# Patient Record
Sex: MALE | Race: OTHER | HISPANIC OR LATINO
[De-identification: names, ages, dates, MRNs, and addresses within clinical notes are randomized per-mention and may not be internally consistent; named-entity substitution may affect disease eponyms.]

---
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
## 2018-09-20 ENCOUNTER — TRANSCRIPTION ENCOUNTER (OUTPATIENT)
Age: 18
End: 2018-09-20

## 2018-09-20 ENCOUNTER — EMERGENCY (EMERGENCY)
Facility: HOSPITAL | Age: 18
LOS: 0 days | Discharge: HOME | End: 2018-09-20
Attending: EMERGENCY MEDICINE | Admitting: EMERGENCY MEDICINE

## 2018-09-20 VITALS
DIASTOLIC BLOOD PRESSURE: 67 MMHG | HEART RATE: 88 BPM | SYSTOLIC BLOOD PRESSURE: 130 MMHG | RESPIRATION RATE: 17 BRPM | OXYGEN SATURATION: 97 % | TEMPERATURE: 98 F

## 2018-09-20 DIAGNOSIS — J02.9 ACUTE PHARYNGITIS, UNSPECIFIED: ICD-10-CM

## 2018-09-20 DIAGNOSIS — J36 PERITONSILLAR ABSCESS: ICD-10-CM

## 2018-09-20 RX ORDER — KETOROLAC TROMETHAMINE 30 MG/ML
30 SYRINGE (ML) INJECTION ONCE
Qty: 0 | Refills: 0 | Status: DISCONTINUED | OUTPATIENT
Start: 2018-09-20 | End: 2018-09-20

## 2018-09-20 RX ORDER — TETRACAINE/BENZOCAINE/BUTAMBEN 2%-14%-2%
1 OINTMENT (GRAM) TOPICAL ONCE
Qty: 0 | Refills: 0 | Status: COMPLETED | OUTPATIENT
Start: 2018-09-20 | End: 2018-09-20

## 2018-09-20 RX ORDER — IBUPROFEN 200 MG
400 TABLET ORAL ONCE
Qty: 0 | Refills: 0 | Status: DISCONTINUED | OUTPATIENT
Start: 2018-09-20 | End: 2018-09-20

## 2018-09-20 RX ADMIN — Medication 30 MILLIGRAM(S): at 14:53

## 2018-09-20 RX ADMIN — Medication 100 MILLIGRAM(S): at 14:53

## 2018-09-20 NOTE — ED PROVIDER NOTE - NS ED ROS FT
GEN:  + fever, no chills  NEURO:  no headache, no weakness, no dizziness  EYES: no eye pain, no eye discharge  ENT:  no ear pain, + sore throat, no runny nose, + difficulty swallowing  CV:  no chest pain, no SOB  RESP:  no dyspnea, no cough  GI:  no nausea, no vomiting, no abdominal pain  :  no dysuria, no hematuria  MSK:  no myalgia, no joint pain, no back pain  SKIN:  no rash

## 2018-09-20 NOTE — ED PROVIDER NOTE - OBJECTIVE STATEMENT
17 yo M with no PMHx who presents with worsening L sided throat swelling and pain x 3 days. Pain is worse with swallowing. Associated fever Tmax 101, increased salivation, change in voice. No drooling, stridor, SOB, nausea, vomiting, abd pain. Went to Urgent Care today and was diagnosed with peritonsillar abscess. He was given decadron 10 mg and motrin 200 mg and sent to the ED for drainage. Reports swelling and pain mildly improved now.

## 2018-09-20 NOTE — ED ADULT TRIAGE NOTE - CHIEF COMPLAINT QUOTE
Pt BIBA for evaluation of peritonsillar abscess. Pt having difficulty swallowing. No difficulty breathing

## 2018-09-20 NOTE — ED PROVIDER NOTE - PROGRESS NOTE DETAILS
ATTENDING NOTE: 17 y/o male with no significant PMH c/o sore throat and muffled voice x 3 days with subjective fever since last night. No H/A, no neck pain. No cough, no SOB. No ABD pain. No N/V. Went to an urgent care center today and received decadron. O/E: Non-toxic in appearance. No respiratory distress. No pallor, no jaundice. TM's clear b/l. Throat with erythema, injection, bulging of left tonsil with mild uvular deviation to right. Airway patent. Neck supple, no meningeal signs. No tenderness over external jugular vein. Lungs CTA b/l. S1 S2 regular, no murmur. ABD soft, no tenderness. No pedal edema, no calf tenderness. No skin rash. No neurologic deficits.   Imp: Pharyngitis. Likely small peritonsillar abscess. No sign of Lemierre's.  A/P: I&D, abx. Already received decadron. Will refer to ENT for f/u. Performed PTA drainage, no discharge aspirated. Pt given clindamycin 900 mg IV here in ED. Sent rx for clindamycin 450 mg Q6H x 14 d. Instructed to return to ED in 2 days for recheck. Given ENT follow up. Motrin prn for pain. Performed PTA drainage, no discharge aspirated. Pt given clindamycin 900 mg IV here in ED. Sent rx for clindamycin 300 mg Q6H x 14 d. Instructed to return to ED in 2 days for recheck. Given ENT follow up. Motrin prn for pain.

## 2018-09-20 NOTE — ED PROVIDER NOTE - PHYSICAL EXAMINATION
CONSTITUTIONAL: nontoxic appearing, in no acute distress  HEAD:  normocephalic, atraumatic  EYES:  no conjunctival injection, no eye discharge, tracking well  ENT:  tympanic membranes intact bilaterally, moist mucous membranes, L sided soft palate and tonsillar swelling/erythema, uvula deviated to L  NECK:  supple, no masses, no midline tenderness  L anterior cervical lymphadenopathy  CV:  regular rate and rhythm, no murmurs, no rubs, cap refill < 2 seconds  RESP:  normal respiratory effort, lungs clear to auscultation bilaterally, no wheezes, no crackles, no retractions, no stridor  ABD:  soft, nontender, nondistended, no masses, no organomegaly, normoactive bowel sounds  LYMPH:  L anterior cervical lymphadenopathy  MSK/NEURO:  normal movement, normal tone  SKIN:  warm, dry, no rash

## 2018-09-20 NOTE — ED PEDIATRIC NURSE NOTE - OBJECTIVE STATEMENT
Sent in by Urgent care for L side peritonsillar abscess x 3 days with difficulty speaking and swallowing. No difficulty breathing or sob

## 2018-09-20 NOTE — ED PEDIATRIC NURSE NOTE - NSIMPLEMENTINTERV_GEN_ALL_ED
Implemented All Universal Safety Interventions:  [place] to call system. Call bell, personal items and telephone within reach. Instruct patient to call for assistance. Room bathroom lighting operational. Non-slip footwear when patient is off stretcher. Physically safe environment: no spills, clutter or unnecessary equipment. Stretcher in lowest position, wheels locked, appropriate side rails in place.

## 2018-09-20 NOTE — ED PROVIDER NOTE - MEDICAL DECISION MAKING DETAILS
I&D performed but no pus obtained. Treated with abx. Referred to ENT for f/u. Will return to ED in 2 days for recheck.

## 2022-06-30 ENCOUNTER — APPOINTMENT (OUTPATIENT)
Dept: NEUROLOGY | Facility: CLINIC | Age: 22
End: 2022-06-30

## 2022-06-30 VITALS
HEART RATE: 68 BPM | HEIGHT: 70 IN | WEIGHT: 180 LBS | BODY MASS INDEX: 25.77 KG/M2 | SYSTOLIC BLOOD PRESSURE: 136 MMHG | DIASTOLIC BLOOD PRESSURE: 85 MMHG

## 2022-07-08 ENCOUNTER — APPOINTMENT (OUTPATIENT)
Dept: PAIN MANAGEMENT | Facility: CLINIC | Age: 22
End: 2022-07-08

## 2022-07-08 VITALS
DIASTOLIC BLOOD PRESSURE: 82 MMHG | HEART RATE: 67 BPM | BODY MASS INDEX: 25.77 KG/M2 | WEIGHT: 180 LBS | HEIGHT: 70 IN | SYSTOLIC BLOOD PRESSURE: 127 MMHG

## 2022-07-08 DIAGNOSIS — M62.830 MUSCLE SPASM OF BACK: ICD-10-CM

## 2022-07-08 DIAGNOSIS — M54.16 RADICULOPATHY, LUMBAR REGION: ICD-10-CM

## 2022-07-08 DIAGNOSIS — M54.50 LOW BACK PAIN, UNSPECIFIED: ICD-10-CM

## 2022-07-08 PROCEDURE — 99072 ADDL SUPL MATRL&STAF TM PHE: CPT

## 2022-07-08 PROCEDURE — 99213 OFFICE O/P EST LOW 20 MIN: CPT | Mod: PA

## 2022-07-08 NOTE — HISTORY OF PRESENT ILLNESS
[FreeTextEntry1] : HISTORY OF PRESENT ILLNESS: Mr. Bustos is a 22-year-old man presenting today to establish care for his lower back pain and neck pain. He states he had slipped on detergent on the floor, landing on his back. In regard to his lower back pain, pain is constant, severe and rated a 10/10 on the pain scale. Pain is present and radiates into his right leg.\par \par In regard to his neck, pain is severe and radiates into his trapezius.\par \par The pain started after an injury at work. The patient has had this pain for about 1 month. Patient describes pain as severe. During the last month the pain has been constant with symptoms worsening in no typical pattern. Pain described as sharp, pressure-like, cramping, dull, throbbing, shooting. Pain is increased with standing, exercise. Pain is decreased with relaxation. Pain is not changed with lying down, sitting, walking. Bowel or bladder habits have not changed.\par \par ACTIVITIES: Patient could walk 0 blocks before the pain starts. Patient can sit with no pain. Patient can stand 15 minutes before pain starts. The patient constantly lays down because of pain. Patient uses no assisted walking device at this time. Patient has difficulty going to work, doing yardwork or shopping, socializing with friends, participating in recreational activities or exercise at this time.\par \par Prior Pain Medications: Naproxen, Flexeril\par \par TODAY: He presents for a revisit encounter. His lower back pain has improved. He completed PT. He continues with neck and the thoracic pain, which are not part of his WC case. Patient states he wishes to go back to work with restrictions.

## 2022-07-08 NOTE — DISCUSSION/SUMMARY
[de-identified] : 22-year-old male with lower back pain likely musculoskeletal in nature. He completed PT. I have discussed the importance of weight loss and a low-impact exercise regimen. He will follow up in 6-8 weeks for reevaluation. He will return back to work with restrictions.\par \par María Hernandez PA-C\par Lars Gayle MD\par

## 2022-08-11 ENCOUNTER — NON-APPOINTMENT (OUTPATIENT)
Age: 22
End: 2022-08-11

## 2022-09-09 ENCOUNTER — APPOINTMENT (OUTPATIENT)
Dept: PAIN MANAGEMENT | Facility: CLINIC | Age: 22
End: 2022-09-09

## 2022-11-16 ENCOUNTER — NON-APPOINTMENT (OUTPATIENT)
Age: 22
End: 2022-11-16

## 2022-12-16 ENCOUNTER — EMERGENCY (EMERGENCY)
Facility: HOSPITAL | Age: 22
LOS: 0 days | Discharge: HOME | End: 2022-12-16
Attending: EMERGENCY MEDICINE | Admitting: EMERGENCY MEDICINE

## 2022-12-16 VITALS
SYSTOLIC BLOOD PRESSURE: 143 MMHG | HEART RATE: 98 BPM | TEMPERATURE: 98 F | RESPIRATION RATE: 18 BRPM | DIASTOLIC BLOOD PRESSURE: 87 MMHG | OXYGEN SATURATION: 100 %

## 2022-12-16 DIAGNOSIS — K02.9 DENTAL CARIES, UNSPECIFIED: ICD-10-CM

## 2022-12-16 DIAGNOSIS — K08.89 OTHER SPECIFIED DISORDERS OF TEETH AND SUPPORTING STRUCTURES: ICD-10-CM

## 2022-12-16 DIAGNOSIS — J45.909 UNSPECIFIED ASTHMA, UNCOMPLICATED: ICD-10-CM

## 2022-12-16 PROCEDURE — 99284 EMERGENCY DEPT VISIT MOD MDM: CPT

## 2022-12-16 RX ORDER — ALBUTEROL 90 UG/1
2 AEROSOL, METERED ORAL
Qty: 1 | Refills: 0
Start: 2022-12-16 | End: 2023-01-14

## 2022-12-16 RX ORDER — IBUPROFEN 200 MG
800 TABLET ORAL ONCE
Refills: 0 | Status: COMPLETED | OUTPATIENT
Start: 2022-12-16 | End: 2022-12-16

## 2022-12-16 RX ADMIN — Medication 800 MILLIGRAM(S): at 09:08

## 2022-12-16 NOTE — ED ADULT NURSE NOTE - OBJECTIVE STATEMENT
pt c/o right dental pain, right cheek is swollen, painful tooth 3rd from the back lower right side is intact, airway clear, pain started yesterday, pt woke up today with right cheek swelling

## 2022-12-16 NOTE — ED PROVIDER NOTE - OBJECTIVE STATEMENT
22-year-old male history of asthma otherwise healthy presenting for evaluation of right mandibular dentalgia.  No fevers chills drainage.  Noted swelling to the face this morning.  No difficulty breathing/swallowing.  Symptoms gradual onset, moderate.  Took Advil last night with mild improvement.

## 2022-12-16 NOTE — ED ADULT NURSE NOTE - NS ED NOTE ABUSE SUSPICION NEGLECT YN
Patient called back and stated that she is wanting to get an MRI before she see's Dr. Higuera. I explained to her that I would have to send a message to the nurse and she would have to get the okay from Dr. Higuera as well as an order. Pt also stated he would like her order sent to another facility to be done. Information was sent to nurse to call pt.   No

## 2022-12-16 NOTE — ED PROVIDER NOTE - PHYSICAL EXAMINATION
Well appearing, NAD, non toxic. NCAT PERRLA EOMI neck supple non tender normal wob  Right facial swelling, airway intact, no drooling, no stridor, no pooling of secretions, no posterior oropharyngeal erythema/edema/lesions. Speaking in full sentences. +tolerating secretions, tolerating PO WWPx4 neuro non focal

## 2022-12-16 NOTE — CONSULT NOTE ADULT - SUBJECTIVE AND OBJECTIVE BOX
Patient is a 22y old  Male who presents with a chief complaint of pain in the lower right side.     HPI: Patient developed a swelling in the lower right side last night.       PAST MEDICAL & SURGICAL HISTORY:  No pertinent past medical history        (   ) heart valve replacement  (   ) joint replacement  (   ) pregnancy    MEDICATIONS  (STANDING):    MEDICATIONS  (PRN):      Allergies    No Known Allergies    Intolerances        FAMILY HISTORY:      *SOCIAL HISTORY: (   ) Tobacco; (   ) ETOH    *Last Dental Visit:    Vital Signs Last 24 Hrs  T(C): 36.8 (16 Dec 2022 08:48), Max: 36.8 (16 Dec 2022 08:48)  T(F): 98.2 (16 Dec 2022 08:48), Max: 98.2 (16 Dec 2022 08:48)  HR: 98 (16 Dec 2022 08:48) (98 - 98)  BP: 143/87 (16 Dec 2022 08:48) (143/87 - 143/87)  BP(mean): --  RR: 18 (16 Dec 2022 08:48) (18 - 18)  SpO2: 100% (16 Dec 2022 08:48) (100% - 100%)    Parameters below as of 16 Dec 2022 08:48  Patient On (Oxygen Delivery Method): room air        LABS:                  EOE:  TMJ ( -  ) clicks                     (  - ) pops                     (  - ) crepitus             Mandible <<FROM>>             Facial bones and MOM <<grossly intact>>             (  - ) trismus             ( -  ) lymphadenopathy             ( +  ) swelling             ( +  ) asymmetry             ( +  ) palpation             ( -  ) dyspnea             ( -  ) dysphagia             (  - ) loss of consciousness    IOE:  <<permanent/primary/mixed>> dentition: <<grossly intact>> OR <<multiple carious teeth>> OR <<multiple missing teeth>>           hard/soft palate:  (   ) palatal torus, <<No pathology noted>>           tongue/FOM <<No pathology noted>>           labial/buccal mucosa <<No pathology noted>>           (+  ) percussion           (  + ) palpation           ( +  ) swelling            (  - ) abscess           (  - ) sinus tract    Dentition present: <<y   >>  Mobility: <<y  >>  Caries: << y  >>         *DENTAL RADIOGRAPHS: periapical x-ray tooth #30     RADIOLOGY & ADDITIONAL STUDIES:    *ASSESSMENT: Clinically observed vestibular swelling in the lower right side. Patient reports pain upon palpation and percussion of tooth #30.   Radiographically observed tooth #30 gross caries and periapical pathology, non-restorable.       *PLAN: Extraction tooth #30    PROCEDURE:    Consents and site obtained. Risks and benefits discussed with patient as per OS sheet dated 07/13/00. Albuterol inhaler present on tray. Administered 3 carpules of 2% lidocaine with 1:100,000 epinephrine via right inferior alveolar block and buccal infiltration. Throat screen placed. Elevated and extracted #30. Irrigated with saline solution. Hemostasis achieved. Post-op periapical c x-ray taken. Post- op instructions given to the patient.   RX: Amoxicillin 500 mg 1 tablet every 8 hours for 7 days.     RECOMMENDATIONS:  1) <<   >>  2) Dental F/U with outpatient dentist for comprehensive dental care.   3) If any difficulty swallowing/breathing, fever occur, return to ER.     Resident Name, pager #

## 2022-12-16 NOTE — ED PROVIDER NOTE - CLINICAL SUMMARY MEDICAL DECISION MAKING FREE TEXT BOX
22-year-old male history of asthma otherwise healthy presenting for evaluation of right mandibular dentalgia.  No fevers chills drainage.  Noted swelling to the face this morning.  No difficulty breathing/swallowing.  Symptoms gradual onset, moderate.  Took Advil last night with mild improvement. aou dental

## 2023-01-09 NOTE — ED PEDIATRIC NURSE NOTE - PRIMARY CARE PROVIDER
PMD Otezla Counseling: The side effects of Otezla were discussed with the patient, including but not limited to worsening or new depression, weight loss, diarrhea, nausea, upper respiratory tract infection, and headache. Patient instructed to call the office should any adverse effect occur.  The patient verbalized understanding of the proper use and possible adverse effects of Otezla.  All the patient's questions and concerns were addressed.

## 2025-06-01 ENCOUNTER — EMERGENCY (EMERGENCY)
Facility: HOSPITAL | Age: 25
LOS: 1 days | End: 2025-06-01
Attending: STUDENT IN AN ORGANIZED HEALTH CARE EDUCATION/TRAINING PROGRAM | Admitting: STUDENT IN AN ORGANIZED HEALTH CARE EDUCATION/TRAINING PROGRAM
Payer: MEDICAID

## 2025-06-01 VITALS
WEIGHT: 179.9 LBS | HEART RATE: 88 BPM | TEMPERATURE: 98 F | DIASTOLIC BLOOD PRESSURE: 56 MMHG | RESPIRATION RATE: 19 BRPM | OXYGEN SATURATION: 99 % | SYSTOLIC BLOOD PRESSURE: 119 MMHG

## 2025-06-01 LAB
ALBUMIN SERPL ELPH-MCNC: 3.8 G/DL — SIGNIFICANT CHANGE UP (ref 3.3–5)
ALP SERPL-CCNC: 67 U/L — SIGNIFICANT CHANGE UP (ref 40–120)
ALT FLD-CCNC: 15 U/L — SIGNIFICANT CHANGE UP (ref 4–41)
ANION GAP SERPL CALC-SCNC: 11 MMOL/L — SIGNIFICANT CHANGE UP (ref 7–14)
APPEARANCE UR: CLEAR — SIGNIFICANT CHANGE UP
AST SERPL-CCNC: 12 U/L — SIGNIFICANT CHANGE UP (ref 4–40)
BASOPHILS # BLD AUTO: 0.04 K/UL — SIGNIFICANT CHANGE UP (ref 0–0.2)
BASOPHILS NFR BLD AUTO: 0.3 % — SIGNIFICANT CHANGE UP (ref 0–2)
BILIRUB SERPL-MCNC: 0.6 MG/DL — SIGNIFICANT CHANGE UP (ref 0.2–1.2)
BILIRUB UR-MCNC: NEGATIVE — SIGNIFICANT CHANGE UP
BUN SERPL-MCNC: 13 MG/DL — SIGNIFICANT CHANGE UP (ref 7–23)
CALCIUM SERPL-MCNC: 8.6 MG/DL — SIGNIFICANT CHANGE UP (ref 8.4–10.5)
CHLORIDE SERPL-SCNC: 104 MMOL/L — SIGNIFICANT CHANGE UP (ref 98–107)
CO2 SERPL-SCNC: 19 MMOL/L — LOW (ref 22–31)
COLOR SPEC: YELLOW — SIGNIFICANT CHANGE UP
CREAT SERPL-MCNC: 0.75 MG/DL — SIGNIFICANT CHANGE UP (ref 0.5–1.3)
DIFF PNL FLD: NEGATIVE — SIGNIFICANT CHANGE UP
EGFR: 127 ML/MIN/1.73M2 — SIGNIFICANT CHANGE UP
EGFR: 127 ML/MIN/1.73M2 — SIGNIFICANT CHANGE UP
EOSINOPHIL # BLD AUTO: 0.13 K/UL — SIGNIFICANT CHANGE UP (ref 0–0.5)
EOSINOPHIL NFR BLD AUTO: 1.1 % — SIGNIFICANT CHANGE UP (ref 0–6)
GLUCOSE SERPL-MCNC: 153 MG/DL — HIGH (ref 70–99)
GLUCOSE UR QL: NEGATIVE MG/DL — SIGNIFICANT CHANGE UP
HCT VFR BLD CALC: 42.7 % — SIGNIFICANT CHANGE UP (ref 39–50)
HGB BLD-MCNC: 15 G/DL — SIGNIFICANT CHANGE UP (ref 13–17)
IANC: 9.41 K/UL — HIGH (ref 1.8–7.4)
IMM GRANULOCYTES NFR BLD AUTO: 0.3 % — SIGNIFICANT CHANGE UP (ref 0–0.9)
KETONES UR QL: NEGATIVE MG/DL — SIGNIFICANT CHANGE UP
LEUKOCYTE ESTERASE UR-ACNC: NEGATIVE — SIGNIFICANT CHANGE UP
LYMPHOCYTES # BLD AUTO: 1.32 K/UL — SIGNIFICANT CHANGE UP (ref 1–3.3)
LYMPHOCYTES # BLD AUTO: 11.2 % — LOW (ref 13–44)
MCHC RBC-ENTMCNC: 30.4 PG — SIGNIFICANT CHANGE UP (ref 27–34)
MCHC RBC-ENTMCNC: 35.1 G/DL — SIGNIFICANT CHANGE UP (ref 32–36)
MCV RBC AUTO: 86.4 FL — SIGNIFICANT CHANGE UP (ref 80–100)
MONOCYTES # BLD AUTO: 0.8 K/UL — SIGNIFICANT CHANGE UP (ref 0–0.9)
MONOCYTES NFR BLD AUTO: 6.8 % — SIGNIFICANT CHANGE UP (ref 2–14)
NEUTROPHILS # BLD AUTO: 9.41 K/UL — HIGH (ref 1.8–7.4)
NEUTROPHILS NFR BLD AUTO: 80.3 % — HIGH (ref 43–77)
NITRITE UR-MCNC: NEGATIVE — SIGNIFICANT CHANGE UP
NRBC # BLD AUTO: 0 K/UL — SIGNIFICANT CHANGE UP (ref 0–0)
NRBC # FLD: 0 K/UL — SIGNIFICANT CHANGE UP (ref 0–0)
NRBC BLD AUTO-RTO: 0 /100 WBCS — SIGNIFICANT CHANGE UP (ref 0–0)
PH UR: 6.5 — SIGNIFICANT CHANGE UP (ref 5–8)
PLATELET # BLD AUTO: 203 K/UL — SIGNIFICANT CHANGE UP (ref 150–400)
POTASSIUM SERPL-MCNC: 3.8 MMOL/L — SIGNIFICANT CHANGE UP (ref 3.5–5.3)
POTASSIUM SERPL-SCNC: 3.8 MMOL/L — SIGNIFICANT CHANGE UP (ref 3.5–5.3)
PROT SERPL-MCNC: 6.1 G/DL — SIGNIFICANT CHANGE UP (ref 6–8.3)
PROT UR-MCNC: NEGATIVE MG/DL — SIGNIFICANT CHANGE UP
RBC # BLD: 4.94 M/UL — SIGNIFICANT CHANGE UP (ref 4.2–5.8)
RBC # FLD: 12 % — SIGNIFICANT CHANGE UP (ref 10.3–14.5)
SODIUM SERPL-SCNC: 134 MMOL/L — LOW (ref 135–145)
SP GR SPEC: 1.03 — SIGNIFICANT CHANGE UP (ref 1–1.03)
UROBILINOGEN FLD QL: 0.2 MG/DL — SIGNIFICANT CHANGE UP (ref 0.2–1)
WBC # BLD: 11.74 K/UL — HIGH (ref 3.8–10.5)
WBC # FLD AUTO: 11.74 K/UL — HIGH (ref 3.8–10.5)

## 2025-06-01 PROCEDURE — 74177 CT ABD & PELVIS W/CONTRAST: CPT | Mod: 26

## 2025-06-01 PROCEDURE — 99285 EMERGENCY DEPT VISIT HI MDM: CPT

## 2025-06-01 PROCEDURE — 93010 ELECTROCARDIOGRAM REPORT: CPT

## 2025-06-01 RX ORDER — KETOROLAC TROMETHAMINE 30 MG/ML
15 INJECTION, SOLUTION INTRAMUSCULAR; INTRAVENOUS ONCE
Refills: 0 | Status: DISCONTINUED | OUTPATIENT
Start: 2025-06-01 | End: 2025-06-01

## 2025-06-01 RX ORDER — ONDANSETRON HCL/PF 4 MG/2 ML
4 VIAL (ML) INJECTION ONCE
Refills: 0 | Status: COMPLETED | OUTPATIENT
Start: 2025-06-01 | End: 2025-06-01

## 2025-06-01 RX ADMIN — Medication 1000 MILLILITER(S): at 02:59

## 2025-06-01 RX ADMIN — Medication 4 MILLIGRAM(S): at 03:28

## 2025-06-01 RX ADMIN — KETOROLAC TROMETHAMINE 15 MILLIGRAM(S): 30 INJECTION, SOLUTION INTRAMUSCULAR; INTRAVENOUS at 02:59

## 2025-06-01 NOTE — ED PROVIDER NOTE - PHYSICAL EXAMINATION
PHYSICAL EXAM:  CONSTITUTIONAL: Well appearing, awake, alert, oriented to person, place, time/situation and in no apparent distress.  HEAD: Atraumatic  EYES: Clear bilaterally, pupils equal, round and reactive to light.  ENMT: Airway patent, Nasal mucosa clear. Mouth with normal mucosa. Uvula is midline.   CARDIAC: Normal rate, regular rhythm. +S1/S2. No murmurs, rubs or gallops.  RESPIRATORY: Breathing unlabored. Breath sounds clear and equal bilaterally.  ABDOMEN: Soft nondistended, left lower quadrant tenderness without guarding or rebound  BACK: Some very mild left-sided CVA tenderness, no swelling appreciated to the flank, no skin changes.

## 2025-06-01 NOTE — ED PROVIDER NOTE - ATTENDING CONTRIBUTION TO CARE
25-year-old with flank and abdominal pain.  Nonactionable vital signs, exam some mild abdominal, mild CVA tenderness.  Will obtain screening blood work, urinalysis, CT abdomen pelvis to evaluate for possible diverticulitis versus nephrolithiasis, will manage symptomatically, hydrate, reassess

## 2025-06-01 NOTE — ED PROVIDER NOTE - NSFOLLOWUPINSTRUCTIONS_ED_ALL_ED_FT
We've got you covered from head to toe    Our specialty programs:    [facility]  [phone]  [email or web address]    Paty (Scheduling) team hours of operation:  Monday through Thursday, 7am to 8:30pm  Friday, 7am to 7pm  Saturday, 8am to 4pm    Back Pain  WHAT YOU NEED TO KNOW:  Back pain is common. It can be caused by many conditions, such as arthritis or the breakdown of spinal discs. Your risk for back pain is increased by injuries, lack of activity, or repeated bending and twisting. You may feel sore or stiff on one or both sides of your back. The pain may spread to your buttocks or thighs.  DISCHARGE INSTRUCTIONS:  Return to the emergency department if:   •You have pain, numbness, or weakness in one or both legs.  •Your pain becomes so severe that you cannot walk.  •You cannot control your urine or bowel movements.  •You have severe back pain with chest pain.  •You have severe back pain, nausea, and vomiting.  •You have severe back pain that spreads to your side or genital area.  Contact your healthcare provider if:   •You have back pain that does not get better with rest and pain medicine.  •You have a fever.  •You have pain that worsens when you are on your back or when you rest.  •You have pain that worsens when you cough or sneeze.  •You lose weight without trying.  •You have questions or concerns about your condition or care.  Medicines:   •NSAIDs help decrease swelling and pain. This medicine is available with or without a doctor's order. NSAIDs can cause stomach bleeding or kidney problems in certain people. If you take blood thinner medicine, always ask your healthcare provider if NSAIDs are safe for you. Always read the medicine label and follow directions.  •Acetaminophen decreases pain and fever. It is available without a doctor's order. Ask how much to take and how often to take it. Follow directions. Read the labels of all other medicines you are using to see if they also contain acetaminophen, or ask your doctor or pharmacist. Acetaminophen can cause liver damage if not taken correctly. Do not use more than 4 grams (4,000 milligrams) total of acetaminophen in one day.   •Muscle relaxers help decrease muscle spasms and back pain.  •Prescription pain medicine may be given. Ask your healthcare provider how to take this medicine safely. Some prescription pain medicines contain acetaminophen. Do not take other medicines that contain acetaminophen without talking to your healthcare provider. Too much acetaminophen may cause liver damage. Prescription pain medicine may cause constipation. Ask your healthcare provider how to prevent or treat constipation.   •Take your medicine as directed. Contact your healthcare provider if you think your medicine is not helping or if you have side effects. Tell him or her if you are allergic to any medicine. Keep a list of the medicines, vitamins, and herbs you take. Include the amounts, and when and why you take them. Bring the list or the pill bottles to follow-up visits. Carry your medicine list with you in case of an emergency.  How to manage your back pain:   •Apply ice on your back for 15 to 20 minutes every hour or as directed. Use an ice pack, or put crushed ice in a plastic bag. Cover it with a towel before you apply it to your skin. Ice helps prevent tissue damage and decreases pain.  •Apply heat on your back for 20 to 30 minutes every 2 hours for as many days as directed. Heat helps decrease pain and muscle spasms.  •Stay active as much as you can without causing more pain. Bed rest could make your back pain worse. Avoid heavy lifting until your pain is gone.  •Go to physical therapy as directed. A physical therapist can teach you exercises to help improve movement and strength, and to decrease pain.  Follow up with your healthcare provider in 2 weeks, or as directed: Write down your questions so you remember to ask them during your visits.

## 2025-06-01 NOTE — ED ADULT NURSE NOTE - OBJECTIVE STATEMENT
Pt is A&Ox4 and ambulatory at baseline. Pt has a hx of asthma. Pt c/o near syncopal episode earlier tonight and left flank pain x 2 weeks. Respirations are equal and unlabored bilaterally. Pt is able to move all extremities. Pt is not pallor or diaphoretic. Abdomen is soft and non distended. #20G IV placed to right AC. Labs drawn and sent. Pt medicated as ordered.  Denies fever, chills, SOB, CP, numbness, tingling, N/V/D. stretcher is in the lowest position and safety maintained.

## 2025-06-01 NOTE — ED ADULT TRIAGE NOTE - CHIEF COMPLAINT QUOTE
Pt c/o left flank pain "swelling" and near syncopal episode x2 tonight. Denies any falls, injury, urinary symptoms. Hx Gastritis, Asthma

## 2025-06-01 NOTE — ED ADULT NURSE REASSESSMENT NOTE - NS ED NURSE REASSESS COMMENT FT1
Break RN: received report from MIKE Singh. pt stable resting in bed in non acute distress. Respirations even and unlabored, chest rise symmetrical b/l. Comfort measures maintained. Bed in lowest position.  Pt awaiting urine sample and ED dispo. Safety maintained.

## 2025-06-01 NOTE — ED PROVIDER NOTE - PATIENT PORTAL LINK FT
You can access the FollowMyHealth Patient Portal offered by [facility] by registering at the following website: [email or web address]. By joining [employer]’s FollowMyHealth portal, you will also be able to view your health information using other applications (apps) compatible with our system.

## 2025-06-01 NOTE — ED PROVIDER NOTE - OBJECTIVE STATEMENT
25-year-old male with reportedly no past medical history on no daily medications, no surgical history presenting with left-sided flank pain and reported swelling.  Patient states that weeks he has had left-sided intermittent flank that today his girlfriend noted "swelling" to his flank area and he was told to come to the emergency department states he is feeling nauseous today as well.  Patient denies any fever chills chest pain shortness of breath

## 2025-06-01 NOTE — ED ADULT NURSE NOTE - NSFALLRISKINTERV_ED_ALL_ED
Assistance with ambulation/Communicate fall risk and risk factors to all staff, patient, and family/Provide visual cue: yellow wristband, yellow gown, etc/Reinforce activity limits and safety measures with patient and family/Call bell, personal items and telephone in reach/Instruct patient to call for assistance before getting out of bed/chair/stretcher/Non-slip footwear applied when patient is off stretcher/[place] to call system/Physically safe environment - no spills, clutter or unnecessary equipment/Purposeful Proactive Rounding/Room/bathroom lighting operational, light cord in reach
